# Patient Record
Sex: FEMALE | Race: WHITE
[De-identification: names, ages, dates, MRNs, and addresses within clinical notes are randomized per-mention and may not be internally consistent; named-entity substitution may affect disease eponyms.]

---

## 2018-07-20 ENCOUNTER — HOSPITAL ENCOUNTER (EMERGENCY)
Dept: HOSPITAL 76 - ED | Age: 58
Discharge: HOME | End: 2018-07-20
Payer: COMMERCIAL

## 2018-07-20 VITALS — SYSTOLIC BLOOD PRESSURE: 124 MMHG | DIASTOLIC BLOOD PRESSURE: 90 MMHG

## 2018-07-20 DIAGNOSIS — J03.90: ICD-10-CM

## 2018-07-20 DIAGNOSIS — H66.002: Primary | ICD-10-CM

## 2018-07-20 PROCEDURE — 99283 EMERGENCY DEPT VISIT LOW MDM: CPT

## 2018-07-20 NOTE — ED PHYSICIAN DOCUMENTATION
PD HPI HEENT





- Stated complaint


Stated Complaint: THROAT PX/NO VOICE





- Chief complaint


Chief Complaint: Heent





- History obtained from


History obtained from: Patient





- History of Present Illness


Timing - onset: How many weeks ago (2)


Timing - duration: Weeks (2)


Timing - details: Gradual onset, Still present, Waxing and waning


Location: Sinuses, Throat


Improves: Medication


Worsens: Swalllowing


Associated symptoms: Congestion, Rhinorrhea, Swollen nodes, Cough


Similar symptoms before: Diagnosis (tonsillitis)


Recently seen: Not recently seen





- Additional information


Additional information: 


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 

cough for the past 2 weeks.  She lost her voice about 1 week ago and this has 

persisted.  She has not coughed up any phlegm she does not feel short of breath 

with this. 








Review of Systems


Constitutional: reports: Fatigue.  denies: Fever


Eyes: denies: Decreased vision


Ears: denies: Ear pain


Nose: reports: Rhinorrhea / runny nose, Congestion


Throat: reports: Sore throat, Swollen tonsils


Cardiac: denies: Chest pain / pressure, Palpitations


Respiratory: reports: Cough.  denies: Dyspnea


GI: denies: Nausea, Vomiting


: denies: Dysuria


Skin: denies: Rash


Musculoskeletal: denies: Neck pain, Back pain


Neurologic: denies: Generalized weakness, Focal weakness, Numbness





PD PAST MEDICAL HISTORY





- Present Medications


Home Medications: 


 Ambulatory Orders











 Medication  Instructions  Recorded  Confirmed


 


Azithromycin [Zithromax] 250 mg PO DAILY #6 tablet 07/20/18 














- Allergies


Allergies/Adverse Reactions: 


 Allergies











Allergy/AdvReac Type Severity Reaction Status Date / Time


 


No Known Drug Allergies Allergy   Verified 07/20/18 06:50














PD ED PE NORMAL





- Vitals


Vital signs reviewed: Yes (tachy and hypertensive )





- General


General: Alert and oriented X 3, No acute distress, Well developed/nourished, 

Other (voice is high pitched and quiet. )





- HEENT


HEENT: Atraumatic, PERRL, EOMI, Other (minimal swelling to the tonsils with 

crypts and exudate more on the left thant the right. The right TM is clear and 

there is some inflamation in the attic on the left. )





- Neck


Neck: Supple, no meningeal sign, No bony TTP, Other (tender submandibular 

adenopathy on the left. )





- Cardiac


Cardiac: RRR, No murmur





- Respiratory


Respiratory: No respiratory distress, Clear bilaterally





- Abdomen


Abdomen: Soft, Non tender





- Back


Back: No CVA TTP, No spinal TTP





- Derm


Derm: Normal color, Warm and dry, No rash





- Extremities


Extremities: No deformity, Normal ROM s pain, No edema, No calf tenderness / 

cord





- Neuro


Neuro: Alert and oriented X 3, No motor deficit, No sensory deficit


Eye Opening: Spontaneous


Motor: Obeys Commands


Verbal: Oriented


GCS Score: 15





- Psych


Psych: Normal mood, Normal affect





Results





- Vitals


Vitals: 





 Vital Signs - 24 hr











  07/20/18





  06:47


 


Temperature 36.3 C L


 


Heart Rate 105 H


 


Respiratory 17





Rate 


 


Blood Pressure 124/90 H


 


O2 Saturation 96








 Oxygen











O2 Source                      Room air

















PD MEDICAL DECISION MAKING





- ED course


Complexity details: considered differential, d/w patient


ED course: 


58-year-old female with a two-week history of cough and congestion has left 

otitis and left tonsillitis and a high-pitched voice.  She is administered 

dexamethasone 10 mg orally we will place her on some azithromycin and ask her 

to call off work for 2 days.








- Sepsis Event


Vital Signs: 





 Vital Signs - 24 hr











  07/20/18





  06:47


 


Temperature 36.3 C L


 


Heart Rate 105 H


 


Respiratory 17





Rate 


 


Blood Pressure 124/90 H


 


O2 Saturation 96








 Oxygen











O2 Source                      Room air

















Departure





- Departure


Disposition: 01 Home, Self Care


Clinical Impression: 


Otitis media


Qualifiers:


 Otitis media type: suppurative Chronicity: acute Laterality: left Recurrence: 

not specified as recurrent Spontaneous tympanic membrane rupture: without 

spontaneous rupture Qualified Code(s): H66.002 - Acute suppurative otitis media 

without spontaneous rupture of ear drum, left ear





Instructions:  ED Otitis Media Acute Adult


Follow-Up: 


hospitals [Provider Group]


Prescriptions: 


Azithromycin [Zithromax] 250 mg PO DAILY #6 tablet


Forms:  Activity restrictions

## 2018-10-01 ENCOUNTER — HOSPITAL ENCOUNTER (EMERGENCY)
Dept: HOSPITAL 76 - ED | Age: 58
Discharge: HOME | End: 2018-10-01
Payer: COMMERCIAL

## 2018-10-01 VITALS — DIASTOLIC BLOOD PRESSURE: 79 MMHG | SYSTOLIC BLOOD PRESSURE: 136 MMHG

## 2018-10-01 DIAGNOSIS — S20.212A: Primary | ICD-10-CM

## 2018-10-01 DIAGNOSIS — S00.33XA: ICD-10-CM
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
## 2018-10-01 PROCEDURE — 1040M: CPT

## 2018-10-01 PROCEDURE — 99283 EMERGENCY DEPT VISIT LOW MDM: CPT

## 2018-10-01 PROCEDURE — 71101 X-RAY EXAM UNILAT RIBS/CHEST: CPT

## 2018-10-01 PROCEDURE — 70160 X-RAY EXAM OF NASAL BONES: CPT

## 2018-10-01 NOTE — ED PHYSICIAN DOCUMENTATION
History of Present Illness





- Stated complaint


Stated Complaint: GLF/NOSE LAC





- Chief complaint


Chief Complaint: Trauma Ext





- History obtained from


History obtained from: Patient





- History of Present Illness


Timing: Today (She was at work today around 9 AM and had a trip and fall forward

hitting her face on the ground.  She also injured both palms, the left elbow and

left chest wall.  No loss of consciousness or headache or neck pain.)





Review of Systems


Constitutional: denies: Fever, Chills


Eyes: denies: Loss of vision, Decreased vision


Ears: denies: Loss of hearing, Ear pain, Drainage/discharge


Nose: reports: Epistaxis.  denies: Rhinorrhea / runny nose


Throat: denies: Sore throat


Cardiac: denies: Chest pain / pressure, Palpitations


Musculoskeletal: denies: Neck pain, Back pain


Neurologic: denies: Headache





PD PAST MEDICAL HISTORY





- Past Surgical History


Past Surgical History: No





- Present Medications


Home Medications: 


                                Ambulatory Orders











 Medication  Instructions  Recorded  Confirmed


 


Levothyroxine Sodium [Synthroid] 1 tab PO DAILY 10/01/18 10/01/18














- Allergies


Allergies/Adverse Reactions: 


                                    Allergies











Allergy/AdvReac Type Severity Reaction Status Date / Time


 


No Known Drug Allergies Allergy   Verified 10/01/18 11:34














- Social History


Does the pt smoke?: No


Smoking Status: Never smoker


Does the pt drink ETOH?: No


Does the pt have substance abuse?: No





- Immunizations


Immunizations are current?: Yes





- POLST


Patient has POLST: No





PD ED PE NORMAL





- Vitals


Vital signs reviewed: Yes





- General


General: Alert and oriented X 3, No acute distress





- HEENT


HEENT: PERRL, EOMI, Other (Mild TTP over bridge of nose, no deformity, no active

 epistaxis or septal hematoma)





- Neck


Neck: Supple, no meningeal sign, No bony TTP





- Respiratory


Respiratory: Clear bilaterally, Other (Mild TTP about)





- Abdomen


Abdomen: Non tender





- Back


Back: No CVA TTP, No spinal TTP





- Extremities


Extremities: Other (There is no tenderness of either hand, wrist, or elbow.  She

 has full range of motion of all.)





- Neuro


Neuro: Alert and oriented X 3, Normal speech





Results





- Vitals


Vitals: 


                               Vital Signs - 24 hr











  10/01/18





  11:31


 


Temperature 36.4 C L


 


Heart Rate 71


 


Respiratory 16





Rate 


 


Blood Pressure 123/83 H


 


O2 Saturation 95








                                     Oxygen











O2 Source                      Room air

















- Rads (name of study)


  ** L ribs XR and nasal XR


Radiology: EMP read contemporaneously (neg)





PD MEDICAL DECISION MAKING





- ED course


ED course: 





58-year-old woman with multiple soft tissue injuries after fall.  She declines 

pain medication.  She wants to go back to return to work full duty and the 

nursing home paperwork was completed.





- Sepsis Event


Vital Signs: 


                               Vital Signs - 24 hr











  10/01/18





  11:31


 


Temperature 36.4 C L


 


Heart Rate 71


 


Respiratory 16





Rate 


 


Blood Pressure 123/83 H


 


O2 Saturation 95








                                     Oxygen











O2 Source                      Room air

















Departure





- Departure


Disposition: 01 Home, Self Care


Clinical Impression: 


Chest wall contusion


Qualifiers:


 Encounter type: initial encounter Laterality: left Qualified Code(s): S20.212A 

- Contusion of left front wall of thorax, initial encounter





Nasal contusion


Qualifiers:


 Encounter type: initial encounter Qualified Code(s): S00.33XA - Contusion of 

nose, initial encounter





Condition: Good


Record reviewed to determine appropriate education?: Yes


Instructions:  ED Contusion Chest Wall


Comments: 


Call your doctor to arrange a follow-up appointment, make the next available 

appointment.  In the interim, return anytime if worse or if new symptoms 

develop.

## 2018-10-01 NOTE — XRAY REPORT
Reason:  rib and nasal inj, fall

Procedure Date:  10/01/2018   

Accession Number:  [id number] / [id number]                    

Procedure:  XR  - Nasal Bones CPT Code:  

 

FULL RESULT:

 

 

EXAM:

NASAL BONES RADIOGRAPHY

 

EXAM DATE: 10/1/2018 01:27 PM.

 

CLINICAL HISTORY: Acute pain due to trauma.

 

COMPARISONS: None.

 

TECHNIQUE: 3 views.

 

FINDINGS:

Bones: Normal. No fractures or bone lesions.

 

Sinuses: Normal. No opacities or fluid levels.

 

Other: Normal. No soft tissue swelling.

IMPRESSION: Normal nasal bone radiography.

 

[facility]

## 2018-10-01 NOTE — XRAY REPORT
Reason:  rib and nasal inj, fall

Procedure Date:  10/01/2018   

Accession Number:  [id number] / [id number]                    

Procedure:  XR  - Ribs w/PA Chest LT CPT Code:  

 

FULL RESULT:

 

 

EXAM:

LEFT RIB RADIOGRAPHY

 

EXAM DATE: 10/1/2018 01:27 PM.

 

CLINICAL HISTORY: Acute pain due to trauma.

 

COMPARISON: None.

 

TECHNIQUE: 1 view of the chest and 2 views of the ribs.

 

FINDINGS:

Bones: Mild degenerative changes are seen in the shoulders. No fracture 

or bone lesion.

 

Lungs: No focal opacities. No pneumothorax. No pleural effusions.

 

Mediastinum: Heart and mediastinal contours are notable for aortic 

calcification.

 

Other: None.

IMPRESSION: No acute cardiopulmonary or osseous abnormality demonstrated.

 

[facility]

## 2019-04-25 ENCOUNTER — HOSPITAL ENCOUNTER (EMERGENCY)
Dept: HOSPITAL 76 - ED | Age: 59
Discharge: HOME | End: 2019-04-25
Payer: COMMERCIAL

## 2019-04-25 VITALS — SYSTOLIC BLOOD PRESSURE: 131 MMHG | DIASTOLIC BLOOD PRESSURE: 83 MMHG

## 2019-04-25 DIAGNOSIS — E03.9: ICD-10-CM

## 2019-04-25 DIAGNOSIS — R42: Primary | ICD-10-CM

## 2019-04-25 LAB
CLARITY UR REFRACT.AUTO: CLEAR
GLUCOSE UR QL STRIP.AUTO: NEGATIVE MG/DL
KETONES UR QL STRIP.AUTO: NEGATIVE MG/DL
NITRITE UR QL STRIP.AUTO: NEGATIVE
PH UR STRIP.AUTO: 6 PH (ref 5–7.5)
PROT UR STRIP.AUTO-MCNC: NEGATIVE MG/DL
RBC # UR STRIP.AUTO: (no result) /UL
RBC # URNS HPF: (no result) /HPF (ref 0–5)
SP GR UR STRIP.AUTO: 1.02 (ref 1–1.03)
SQUAMOUS URNS QL MICRO: (no result)
UROBILINOGEN UR QL STRIP.AUTO: (no result) E.U./DL
UROBILINOGEN UR STRIP.AUTO-MCNC: NEGATIVE MG/DL

## 2019-04-25 PROCEDURE — 99283 EMERGENCY DEPT VISIT LOW MDM: CPT

## 2019-04-25 PROCEDURE — 81001 URINALYSIS AUTO W/SCOPE: CPT

## 2019-04-25 PROCEDURE — 70450 CT HEAD/BRAIN W/O DYE: CPT

## 2019-04-25 PROCEDURE — 87086 URINE CULTURE/COLONY COUNT: CPT

## 2019-04-25 PROCEDURE — 81003 URINALYSIS AUTO W/O SCOPE: CPT

## 2019-04-25 NOTE — ED PHYSICIAN DOCUMENTATION
History of Present Illness





- Stated complaint


Stated Complaint: DIZZY,N,V,HEADACHE





- Chief complaint


Chief Complaint: Neuro





- History obtained from


History obtained from: Patient





- History of Present Illness


Timing: How many days ago (3)





- Additonal information


Additional information: 


The patient is a 59-year-old female who presents with dizziness that started 3 

days ago when getting up from bed.  It has persisted intermittently since that 

time.  It is worse with movement of her head.  She gets associated nausea and 

vomited once yesterday, as well as once today.  She also complains of occipital 

headache.  She denies fever, chest pain or shortness of breath.  She denies 

abdominal pain, but does report mild dysuria.  She reports a history of a 

similar episode that was brief and self-limited about 4 years ago.








Review of Systems


Constitutional: reports: Other (dizziness).  denies: Fever, Fatigue


Eyes: denies: Decreased vision


Ears: denies: Ear pain


Nose: denies: Congestion


Throat: denies: Sore throat


Cardiac: denies: Chest pain / pressure, Palpitations


Respiratory: denies: Dyspnea, Cough


GI: reports: Nausea, Vomiting.  denies: Abdominal Pain


: reports: Dysuria (mild)


Skin: denies: Rash


Musculoskeletal: denies: Neck pain, Back pain, Extremity pain


Neurologic: reports: Headache (occipital).  denies: Focal weakness, Numbness, 

Head injury





PD PAST MEDICAL HISTORY





- Past Medical History


Past Medical History: Yes


Endocrine/Autoimmune: HyPOthyroidism


HEENT: Other





- Past Surgical History


Past Surgical History: No


HEENT: Cataracts





- Present Medications


Home Medications: 


                                Ambulatory Orders











 Medication  Instructions  Recorded  Confirmed


 


Levothyroxine Sodium [Synthroid] 1 tab PO DAILY 10/01/18 10/01/18


 


Meclizine HCl [Motion Sickness 25 mg PO Q6HR PRN #20 tablet 04/25/19 





Relief]   


 


Promethazine [Phenergan] 25 - 50 mg PO Q6H PRN #10 tab 04/25/19 














- Allergies


Allergies/Adverse Reactions: 


                                    Allergies











Allergy/AdvReac Type Severity Reaction Status Date / Time


 


No Known Drug Allergies Allergy   Verified 04/25/19 15:57














- Social History


Does the pt smoke?: No


Smoking Status: Never smoker


Does the pt drink ETOH?: No


Does the pt have substance abuse?: No





- Immunizations


Immunizations are current?: Yes





- POLST


Patient has POLST: No





PD ED PE NORMAL





- Vitals


Vital signs reviewed: Yes (borderline hypertension)





- General


General: Alert and oriented X 3, Well developed/nourished





- HEENT


HEENT: Atraumatic, PERRL, EOMI, Ears normal, Pharynx benign, Other (Fundi 

without papilledema. No nystagmus.)





- Neck


Neck: Supple, no meningeal sign, No adenopathy, No JVD





- Cardiac


Cardiac: RRR, No murmur





- Respiratory


Respiratory: No respiratory distress, Clear bilaterally





- Abdomen


Abdomen: Soft, Non tender





- Back


Back: No CVA TTP





- Derm


Derm: No rash





- Extremities


Extremities: No edema, No calf tenderness / cord





- Neuro


Neuro: Alert and oriented X 3, No motor deficit, No sensory deficit, Normal 

speech





Results





- Vitals


Vitals: 


                               Vital Signs - 24 hr











  04/25/19





  15:53


 


Temperature 36.7 C


 


Heart Rate 75


 


Respiratory 16





Rate 


 


Blood Pressure 140/82 H


 


O2 Saturation 94








                                     Oxygen











O2 Source                      Room air

















- Labs


Labs: 


                                Laboratory Tests











  04/25/19





  16:45


 


Urine Color  YELLOW


 


Urine Clarity  CLEAR


 


Urine pH  6.0


 


Ur Specific Gravity  1.025


 


Urine Protein  NEGATIVE


 


Urine Glucose (UA)  NEGATIVE


 


Urine Ketones  NEGATIVE


 


Urine Occult Blood  SMALL H


 


Urine Nitrite  NEGATIVE


 


Urine Bilirubin  NEGATIVE


 


Urine Urobilinogen  0.2 (NORMAL)


 


Ur Leukocyte Esterase  SMALL H


 


Urine RBC  0-5


 


Urine WBC  4-5


 


Ur Squamous Epith Cells  FEW Squamous


 


Urine Bacteria  None Seen


 


Ur Microscopic Review  INDICATED


 


Urine Culture Comments  INDICATED














- Rads (name of study)


  ** Head CT


Radiology: Prelim report reviewed, EMP read contemporaneously, See rad report 

(Generalized mild age-related cortical atrophic changes without evidence of 

acute intracranial abnormality.)





PD MEDICAL DECISION MAKING





- ED course


Complexity details: reviewed results, re-evaluated patient, considered 

differential, d/w patient


ED course: 


The patient's presentation is most consistent with peripheral vertigo.  Head CT 

is normal, and I doubt central nervous system source for her vertigo and nausea.


Treatment in the emergency department included administration of meclizine 25 mg

orally.  This diminished her symptoms, although did not relieve them completely.

 She is being discharged with prescriptions for meclizine and for Phenergan.  I 

discussed with her the diagnosis, symptomatic treatment and outpatient follow-

up, as well as potentially worrisome signs or symptoms that should prompt 

reevaluation in the emergency department.








Departure





- Departure


Disposition: 01 Home, Self Care


Clinical Impression: 


 Vertigo





Condition: Stable


Instructions:  ED Vertigo Unspecified


Follow-Up: 


YANN XAVIER MD [Primary Care Provider] - 


Prescriptions: 


Meclizine HCl [Motion Sickness Relief] 25 mg PO Q6HR PRN #20 tablet


 PRN Reason: Dizziness


Promethazine [Phenergan] 25 - 50 mg PO Q6H PRN #10 tab


 PRN Reason: Nausea / Vomiting


Comments: 


You can use meclizine as prescribed if needed for dizziness.


You can use Phenergan as prescribed if needed for nausea.


Follow-up with your primary physician within 1 to 2 weeks.  Call to schedule an 

appointment.


Return to the emergency department if you develop increasing dizziness, 

persistent vomiting, increasing headache, or otherwise worsening symptoms.


Forms:  Activity restrictions

## 2019-04-25 NOTE — CT REPORT
Reason:  dizziness

Procedure Date:  04/25/2019   

Accession Number:  [id number] / [id number]                    

Procedure:  CT  - HEAD WO CPT Code:  

 

FULL RESULT:

 

 

EXAM:

CT HEAD

 

EXAM DATE: 4/25/2019 04:33 PM.

 

CLINICAL HISTORY: Dizziness.

 

COMPARISON: None.

 

TECHNIQUE: Multiaxial CT images were obtained from the foramen magnum to 

the vertex. Reformats: Sagittal and coronal. IV contrast: None.

 

In accordance with CT protocol optimization, one or more of the following 

dose reduction techniques were utilized for this exam: automated exposure 

control, adjustment of mA and/or KV based on patient size, or use of 

iterative reconstructive technique.

 

FINDINGS:

Parenchyma: No intraparenchymal hemorrhage. No evidence of mass, midline 

shift, or CT findings of acute infarction. Gray-white differentiation is 

distinct. Diffuse mild chronic microangiopathic white matter changes are 

evident.

 

Extraaxial Spaces: Normal for age. No subdural or epidural collections 

identified.

 

Ventricles: The ventricles and cortical sulci are enlarged, consistent 

with age-related tissue loss.

 

Sinuses and orbits: Imaged paranasal sinuses, orbits, and mastoids show 

no significant abnormality.

 

Bones: No evidence of fracture or calvarial defect.

 

Other: None.

IMPRESSION: Generalized mild age-related cortical atrophic changes 

without evidence of acute intracranial abnormality.

 

[facility]

## 2023-02-08 ENCOUNTER — HOSPITAL ENCOUNTER (OUTPATIENT)
Dept: HOSPITAL 76 - DI | Age: 63
Discharge: HOME | End: 2023-02-08
Attending: NURSE PRACTITIONER
Payer: COMMERCIAL

## 2023-02-08 DIAGNOSIS — H90.42: Primary | ICD-10-CM

## 2023-02-08 PROCEDURE — 70553 MRI BRAIN STEM W/O & W/DYE: CPT

## 2023-02-09 NOTE — MRI REPORT
PROCEDURE:  BRAIN W/WO

 

INDICATIONS:  HEARING LOSS

 

CONTRAST: GADAVIST 8ML 

                       

TECHNIQUE:  

Noncontrast axial T1 spin echo, axial T2 fast spin echo, sagittal and axial FLAIR, coronal T2 fast sp
in echo, axial gradient echo, axial diffusion and ADC through the brain.  Pre and postcontrast high-r
esolution imaging of the internal auditory canals. After the administration of contrast, axial and co
aide T1 spin echo with fat saturation through the brain.  

 

COMPARISON:  None.

 

FINDINGS:  

Image quality:  Excellent.  

 

CSF spaces:  Basal cisterns are patent.  No extra-axial fluid collections.  Ventricles are normal in 
size and shape.  

 

Brain:  No midline shift.  No intracranial bleeds or masses.  No abnormal intracranial enhancement.  
There is cerebral volume loss for age.  There is minimal periventricular white matter chronic small v
essel ischemic change.  The brainstem appears normal.  Diffusion-weighted images demonstrate no acute
 ischemic insults.  No chronic ischemic insults.  Normal intravascular flow voids are present.  

 

The bilateral seventh/8th nerve complexes are within normal limits without abnormal signal intensity/
 enhancement or regions of mass lesion.

 

Skull and face:  Calvarial marrow is normal in signal.  Orbits appear normal.  

 

Sinuses:  Sinuses and mastoids appear clear.  

 

IMPRESSION:  

1. Mild volume loss. Minimal small vessel ischemic disease.

2. Negative evaluation of the internal auditory canals.

3. No acute process. No recent infarct.

 

Reviewed by: Carmine Carter MD on 2/9/2023 8:10 AM PST

Approved by: Carmine Carter MD on 2/9/2023 8:10 AM [facility]

 

 

Station ID:  [id number]

## 2023-06-23 ENCOUNTER — HOSPITAL ENCOUNTER (OUTPATIENT)
Dept: HOSPITAL 76 - LAB.N | Age: 63
Discharge: HOME | End: 2023-06-23
Attending: NURSE PRACTITIONER
Payer: COMMERCIAL

## 2023-06-23 DIAGNOSIS — Z13.220: ICD-10-CM

## 2023-06-23 DIAGNOSIS — R53.83: ICD-10-CM

## 2023-06-23 DIAGNOSIS — E03.9: Primary | ICD-10-CM

## 2023-06-23 LAB
ALBUMIN DIAFP-MCNC: 3.9 G/DL (ref 3.2–5.5)
ALBUMIN/GLOB SERPL: 1.1 {RATIO} (ref 1–2.2)
ALP SERPL-CCNC: 144 IU/L (ref 42–121)
ALT SERPL W P-5'-P-CCNC: 21 IU/L (ref 10–60)
ANION GAP SERPL CALCULATED.4IONS-SCNC: 8 MMOL/L (ref 6–13)
AST SERPL W P-5'-P-CCNC: 23 IU/L (ref 10–42)
BASOPHILS NFR BLD AUTO: 0.1 10^3/UL (ref 0–0.1)
BASOPHILS NFR BLD AUTO: 0.7 %
BILIRUB BLD-MCNC: 0.8 MG/DL (ref 0.2–1)
BUN SERPL-MCNC: 18 MG/DL (ref 6–20)
CALCIUM UR-MCNC: 9.3 MG/DL (ref 8.5–10.3)
CHLORIDE SERPL-SCNC: 109 MMOL/L (ref 101–111)
CHOLEST SERPL-MCNC: 165 MG/DL
CO2 SERPL-SCNC: 25 MMOL/L (ref 21–32)
CREAT SERPLBLD-SCNC: 0.8 MG/DL (ref 0.4–1)
EOSINOPHIL # BLD AUTO: 0.1 10^3/UL (ref 0–0.7)
EOSINOPHIL NFR BLD AUTO: 1.4 %
ERYTHROCYTE [DISTWIDTH] IN BLOOD BY AUTOMATED COUNT: 14.9 % (ref 12–15)
GFRSERPLBLD MDRD-ARVRAT: 72 ML/MIN/{1.73_M2} (ref 89–?)
GLOBULIN SER-MCNC: 3.6 G/DL (ref 2.1–4.2)
GLUCOSE SERPL-MCNC: 94 MG/DL (ref 70–100)
HCT VFR BLD AUTO: 44.6 % (ref 37–47)
HDLC SERPL-MCNC: 55 MG/DL
HDLC SERPL: 3 {RATIO} (ref ?–4.4)
HGB UR QL STRIP: 14.3 G/DL (ref 12–16)
LDLC SERPL CALC-MCNC: 96 MG/DL
LDLC/HDLC SERPL: 1.7 {RATIO} (ref ?–4.4)
LYMPHOCYTES # SPEC AUTO: 2.3 10^3/UL (ref 1.5–3.5)
LYMPHOCYTES NFR BLD AUTO: 31.5 %
MCH RBC QN AUTO: 27.7 PG (ref 27–31)
MCHC RBC AUTO-ENTMCNC: 32.1 G/DL (ref 32–36)
MCV RBC AUTO: 86.3 FL (ref 81–99)
MONOCYTES # BLD AUTO: 0.5 10^3/UL (ref 0–1)
MONOCYTES NFR BLD AUTO: 6.6 %
NEUTROPHILS # BLD AUTO: 4.3 10^3/UL (ref 1.5–6.6)
NEUTROPHILS # SNV AUTO: 7.2 X10^3/UL (ref 4.8–10.8)
NEUTROPHILS NFR BLD AUTO: 59.7 %
NRBC # BLD AUTO: 0 /100WBC
NRBC # BLD AUTO: 0 X10^3/UL
PDW BLD AUTO: 11.6 FL (ref 7.9–10.8)
PLATELET # BLD: 265 10^3/UL (ref 130–450)
POTASSIUM SERPL-SCNC: 4 MMOL/L (ref 3.5–5)
PROT SPEC-MCNC: 7.5 G/DL (ref 6.7–8.2)
RBC MAR: 5.17 10^6/UL (ref 4.2–5.4)
SODIUM SERPLBLD-SCNC: 142 MMOL/L (ref 135–145)
T4 FREE SERPL-MCNC: 1.15 NG/DL (ref 0.58–1.64)
TRIGL P FAST SERPL-MCNC: 68 MG/DL
TSH SERPL-ACNC: 0.89 UIU/ML (ref 0.34–5.6)
VLDLC SERPL-SCNC: 14 MG/DL

## 2023-06-23 PROCEDURE — 84439 ASSAY OF FREE THYROXINE: CPT

## 2023-06-23 PROCEDURE — 80061 LIPID PANEL: CPT

## 2023-06-23 PROCEDURE — 84443 ASSAY THYROID STIM HORMONE: CPT

## 2023-06-23 PROCEDURE — 36415 COLL VENOUS BLD VENIPUNCTURE: CPT

## 2023-06-23 PROCEDURE — 80053 COMPREHEN METABOLIC PANEL: CPT

## 2023-06-23 PROCEDURE — 83721 ASSAY OF BLOOD LIPOPROTEIN: CPT

## 2023-06-23 PROCEDURE — 85025 COMPLETE CBC W/AUTO DIFF WBC: CPT

## 2023-09-15 ENCOUNTER — HOSPITAL ENCOUNTER (OUTPATIENT)
Dept: HOSPITAL 76 - DI | Age: 63
Discharge: HOME | End: 2023-09-15
Attending: NURSE PRACTITIONER
Payer: MEDICARE

## 2023-09-15 DIAGNOSIS — Z78.0: Primary | ICD-10-CM

## 2023-09-15 DIAGNOSIS — M81.0: ICD-10-CM

## 2023-09-15 NOTE — DEXA REPORT
PROCEDURE: Dexa Spine and/or Hip

 

INDICATIONS: POST MENOPAUSAL

 

TECHNIQUE: Dual energy x-ray absorptiometry (DXA) was performed on a [employer] System.  Regions measur
ed are the AP Spine, femoral neck, and if needed forearm.

 

COMPARISON: None

  

FINDINGS:

 

Lumbar Spine: 

Bone Mineral Density 1.089 g/cm/cm,T score  -0.8.

 

Left Femoral Neck:

Bone Mineral Density 0.66 to g/cm/cm, T score -2.7.

 

Left Hip:

Bone Mineral Density 0.728 g/cm/cm,T score -2.2.

 

(T score greater or equal to -1.0: NORMAL)

(T score from -1.1 to -2.4: OSTEOPENIA)

(T score less than or equal to -2.5 to: OSTEOPOROSIS)

 

Impression: 

By WHO criteria, this patient has osteoporosis. 

 

 

Patients with diagnosis of osteoporosis or osteopenia should have regular bone mineral density assess
ment.  For those eligible for Medicare, routine testing is allowed once every 2 years.  Testing frequ
ency can be increased for patients who have rapidly progressing disease or for those who are receivin
g medical therapy to restore bone mass.

 

Reviewed by: Ac Finn MD on 9/15/2023 9:44 AM PDT

Approved by: Ac Finn MD on 9/15/2023 9:44 AM PDT

 

 

Station ID:  [id number]